# Patient Record
Sex: FEMALE | Race: WHITE | Employment: FULL TIME | URBAN - METROPOLITAN AREA
[De-identification: names, ages, dates, MRNs, and addresses within clinical notes are randomized per-mention and may not be internally consistent; named-entity substitution may affect disease eponyms.]

---

## 2017-05-03 ENCOUNTER — OFFICE VISIT (OUTPATIENT)
Dept: OBGYN CLINIC | Age: 47
End: 2017-05-03
Payer: COMMERCIAL

## 2017-05-03 VITALS
RESPIRATION RATE: 16 BRPM | BODY MASS INDEX: 37.5 KG/M2 | SYSTOLIC BLOOD PRESSURE: 130 MMHG | OXYGEN SATURATION: 100 % | HEIGHT: 60 IN | DIASTOLIC BLOOD PRESSURE: 85 MMHG | WEIGHT: 191 LBS | HEART RATE: 89 BPM

## 2017-05-03 DIAGNOSIS — Z01.419 ENCOUNTER FOR GYNECOLOGICAL EXAMINATION WITHOUT ABNORMAL FINDING: Primary | ICD-10-CM

## 2017-05-03 PROCEDURE — 99396 PREV VISIT EST AGE 40-64: CPT | Performed by: OBSTETRICS & GYNECOLOGY

## 2017-05-03 RX ORDER — LEVONORGESTREL AND ETHINYL ESTRADIOL 0.15-0.03
1 KIT ORAL DAILY
Qty: 1 PACKET | Refills: 3 | Status: SHIPPED | OUTPATIENT
Start: 2017-05-03 | End: 2018-04-11 | Stop reason: SDUPTHER

## 2017-05-11 DIAGNOSIS — Z01.419 ENCOUNTER FOR GYNECOLOGICAL EXAMINATION WITHOUT ABNORMAL FINDING: ICD-10-CM

## 2018-04-12 RX ORDER — LEVONORGESTREL AND ETHINYL ESTRADIOL 0.15-0.03
1 KIT ORAL DAILY
Qty: 1 PACKET | Refills: 3 | Status: SHIPPED | OUTPATIENT
Start: 2018-04-12 | End: 2018-05-10 | Stop reason: SDUPTHER

## 2018-05-10 ENCOUNTER — OFFICE VISIT (OUTPATIENT)
Dept: OBGYN CLINIC | Age: 48
End: 2018-05-10
Payer: COMMERCIAL

## 2018-05-10 VITALS
SYSTOLIC BLOOD PRESSURE: 145 MMHG | BODY MASS INDEX: 36.22 KG/M2 | WEIGHT: 217.38 LBS | DIASTOLIC BLOOD PRESSURE: 89 MMHG | HEART RATE: 69 BPM | HEIGHT: 65 IN | OXYGEN SATURATION: 99 %

## 2018-05-10 DIAGNOSIS — Z00.00 ENCOUNTER FOR WELL WOMAN EXAM WITHOUT GYNECOLOGICAL EXAM: Primary | ICD-10-CM

## 2018-05-10 DIAGNOSIS — Z12.31 SCREENING MAMMOGRAM, ENCOUNTER FOR: ICD-10-CM

## 2018-05-10 PROCEDURE — 99396 PREV VISIT EST AGE 40-64: CPT | Performed by: ADVANCED PRACTICE MIDWIFE

## 2018-05-10 RX ORDER — LEVONORGESTREL AND ETHINYL ESTRADIOL 0.15-0.03
1 KIT ORAL DAILY
Qty: 1 PACKET | Refills: 3 | Status: SHIPPED | OUTPATIENT
Start: 2018-05-10 | End: 2019-03-26 | Stop reason: SDUPTHER

## 2018-06-09 PROBLEM — Z00.00 ENCOUNTER FOR WELL WOMAN EXAM WITHOUT GYNECOLOGICAL EXAM: Status: RESOLVED | Noted: 2018-05-10 | Resolved: 2018-06-09

## 2018-06-09 PROBLEM — Z12.31 SCREENING MAMMOGRAM, ENCOUNTER FOR: Status: RESOLVED | Noted: 2018-05-10 | Resolved: 2018-06-09

## 2019-03-26 DIAGNOSIS — Z00.00 ENCOUNTER FOR WELL WOMAN EXAM WITHOUT GYNECOLOGICAL EXAM: ICD-10-CM

## 2019-03-26 NOTE — TELEPHONE ENCOUNTER
Francisca Ku got the letter that you are leaving and has scheduled an appointment with Dr. Frias Current and is needing a refill on her medication until her appointment in May.

## 2019-04-03 RX ORDER — LEVONORGESTREL AND ETHINYL ESTRADIOL 0.15-0.03
1 KIT ORAL DAILY
Qty: 1 PACKET | Refills: 2 | Status: SHIPPED | OUTPATIENT
Start: 2019-04-03 | End: 2020-01-27 | Stop reason: SDUPTHER

## 2019-05-15 ENCOUNTER — OFFICE VISIT (OUTPATIENT)
Dept: OBGYN CLINIC | Age: 49
End: 2019-05-15
Payer: COMMERCIAL

## 2019-05-15 VITALS
BODY MASS INDEX: 33.66 KG/M2 | HEIGHT: 65 IN | HEART RATE: 88 BPM | DIASTOLIC BLOOD PRESSURE: 78 MMHG | SYSTOLIC BLOOD PRESSURE: 122 MMHG | WEIGHT: 202 LBS

## 2019-05-15 DIAGNOSIS — Z01.419 WELL WOMAN EXAM WITH ROUTINE GYNECOLOGICAL EXAM: Primary | ICD-10-CM

## 2019-05-15 PROCEDURE — 99396 PREV VISIT EST AGE 40-64: CPT | Performed by: OBSTETRICS & GYNECOLOGY

## 2019-05-15 NOTE — PROGRESS NOTES
history    Pap History: Last PAP was normal- no cotesting. May/2017. Colposcopy History: denies    Permanent Sterilization: no  Reversible Birth Control: yes - OCPs  Hormone Replacement Exposure: no    OBSTETRICAL HISTORY:  OB History    Para Term  AB Living   3 3 0 0 0 3   SAB TAB Ectopic Molar Multiple Live Births   0 0 0 0 0 0      # Outcome Date GA Lbr Morgan/2nd Weight Sex Delivery Anes PTL Lv   3 Para            2 Para            1 Para                PAST MEDICAL HISTORY:   has a past medical history of Arthritis. PAST SURGICAL HISTORY:   has a past surgical history that includes  section () and Total shoulder arthroplasty (). ALLERGIES:  is allergic to vicodin [hydrocodone-acetaminophen]. MEDICATIONS:  Prior to Admission medications    Medication Sig Start Date End Date Taking? Authorizing Provider   levonorgestrel-ethinyl estradiol (SEASONALE) 0.15-0.03 MG per tablet Take 1 tablet by mouth daily 4/3/19  Yes Carmen MonMD luis       FAMILY HISTORY:  Family History of Breast, Ovarian, Colon or Uterine Cancer: No   family history includes Cancer in her father and mother. SOCIAL HISTORY:   reports that she has never smoked. She has never used smokeless tobacco. She reports that she drinks alcohol. She reports that she does not use drugs. HEALTH MAINTENANCE:  Immunization status: up to date and documented   Date of Last Mammogram: BIRADS1 on 18  Date of Last Colonoscopy: hasn't had one  Date of Last Bone Density: n/a    VITALS:  Vitals:    05/15/19 1011   BP: 122/78   Site: Left Upper Arm   Position: Sitting   Cuff Size: Large Adult   Pulse: 88   Weight: 202 lb (91.6 kg)   Height: 5' 5\" (1.651 m)                                                                                                                                                                         PHYSICAL EXAM:   General Appearance: Appears healthy. Alert; in no acute distress. Pleasant.   Skin: Skin color, texture, turgor normal. No rashes or lesions. HEENT: normocephalic and atraumatic   Respiratory: Normal expansion. Clear to auscultation. No rales, rhonchi, or wheezing. Cardiovascular: normal rate and normal S1 and S2  Breast:  (Chest): normal appearance, no masses or tenderness, No nipple retraction or dimpling, No nipple discharge or bleeding, No axillary or supraclavicular adenopathy, Normal to palpation without dominant masses  Abdomen: soft, non-tender, non-distended, no right upper quadrant tenderness and no CVA tenderness   Pelvic Exam:   External genitalia: normal hair distribution, no erythema, bilateral areas of hypopigmentation on labia majora noted and patient reports it has been present \"for a long time,\" no lesions  Urinary system: urethral meatus normal, no bladder tenderness  Vaginal: normal mucosa, no lesions or discharge noted  Cervix: normal appearing cervix without discharge or lesions, no CMT  Adnexa: normal adnexa in size, nontender and no masses  Uterus: about 6-8wk size, anteverted, nontender, no masses  Musculoskeletal: no gross abnormalities  Extremities: non-tender BLE and non-edematous  Psych:  oriented to time, place and person, mood and affect are within normal limits     DATA:  No results found for this visit on 05/15/19. ASSESSMENT & PLAN:    Bob Hughes is a 47 y.o. female  Patient's last menstrual period was 2019.   - Patient denies any risk factors for cardiovascular disease, has no family or personal h/o VTE and is a nonsmoker. Reviewed that patient may continue her OCPs until about 53yo. Discussed that there are other nonhormonal management therapies to treat perimenopausal symptoms should she choose to discontinue OCPs for heavy menses. She vocalized understanding and would like to continue with seasonale at this time.    - pap smear with cotesting collected and sent today   - states she will call for her mammogram order in 2019   - encouraged compliance with scheduling screening colonoscopy    There are no active problems to display for this patient. Return in about 1 year (around 5/15/2020) for annual or earlier prn. No Patient Care Coordination Note on file. Counseling Completed:    Discussed need for repeat pap as per American Society for Colposcopy and Cervical Pathology guidelines. Discussed need for mammograms every 1 year, If >42 yo and last mammogram was negative. Discussed Calcium and Vitamin D dosing. Discussed need for colonoscopy screening as well as onset for bone density testing. Birth control and barrier recommendations reviewed. Discussed STD counseling and prevention. Hereditary Breast, Ovarian, Colon and Uterine Cancer screening done. Tobacco & Secondary smoke risks reviewed; with recommendation for cessation and avoidance. Routine health maintenance per patients PCP     Diagnosis Orders   1.  Well woman exam with routine gynecological exam  PAP Smear          Cuate Lawrence DO   [address]  5/15/2019 10:43 AM

## 2019-05-22 DIAGNOSIS — Z01.419 WELL WOMAN EXAM WITH ROUTINE GYNECOLOGICAL EXAM: ICD-10-CM

## 2020-01-27 RX ORDER — LEVONORGESTREL AND ETHINYL ESTRADIOL 0.15-0.03
1 KIT ORAL DAILY
Qty: 1 PACKET | Refills: 3 | Status: SHIPPED | OUTPATIENT
Start: 2020-01-27 | End: 2020-04-20 | Stop reason: SDUPTHER

## 2020-04-20 ENCOUNTER — TELEPHONE (OUTPATIENT)
Dept: OBGYN CLINIC | Age: 50
End: 2020-04-20

## 2020-04-20 RX ORDER — LEVONORGESTREL AND ETHINYL ESTRADIOL 0.15-0.03
1 KIT ORAL DAILY
Qty: 1 PACKET | Refills: 0 | Status: SHIPPED | OUTPATIENT
Start: 2020-04-20 | End: 2020-06-11 | Stop reason: SDUPTHER

## 2020-06-11 ENCOUNTER — OFFICE VISIT (OUTPATIENT)
Dept: OBGYN CLINIC | Age: 50
End: 2020-06-11

## 2020-06-11 VITALS
BODY MASS INDEX: 33.49 KG/M2 | WEIGHT: 201 LBS | HEIGHT: 65 IN | DIASTOLIC BLOOD PRESSURE: 78 MMHG | SYSTOLIC BLOOD PRESSURE: 122 MMHG

## 2020-06-11 PROCEDURE — 99396 PREV VISIT EST AGE 40-64: CPT | Performed by: OBSTETRICS & GYNECOLOGY

## 2020-06-11 RX ORDER — CLOTRIMAZOLE AND BETAMETHASONE DIPROPIONATE 10; .64 MG/G; MG/G
CREAM TOPICAL
Qty: 1 TUBE | Refills: 1 | Status: SHIPPED | OUTPATIENT
Start: 2020-06-11

## 2020-06-11 RX ORDER — LEVONORGESTREL AND ETHINYL ESTRADIOL 0.15-0.03
1 KIT ORAL DAILY
Qty: 1 PACKET | Refills: 3 | Status: SHIPPED | OUTPATIENT
Start: 2020-06-11 | End: 2021-06-10 | Stop reason: SDUPTHER

## 2020-06-11 NOTE — PROGRESS NOTES
medications for this visit. Allergies: Allergies   Allergen Reactions    Vicodin [Hydrocodone-Acetaminophen] Rash       Gynecologic History:  Patient's last menstrual period was 2020. Sexually Active: Yes  STD History: No  Abnormal Pap History no  Birth Control: Yes, OCPs    OB History    Para Term  AB Living   3 3 0 0 0 3   SAB TAB Ectopic Molar Multiple Live Births   0 0 0 0 0 0     ______________________________________________________________________  REVIEW OF SYSTEMS:        Constitutional:  Unexpected weight change no  Neurological:  Frequent headaches  no  Ophthalmic:  Recent visual changes no  ENT:   Difficulty swallowing  no  Breast:              Masses   no     Respiratory:  Shortness of breath  no    Cardiovascular: Chest pain   no     Gastrointestinal: Chronic diarrhea/constipation no   Urogenital:  Urinary incontinence  no                                         Heavy/irregular periods           no                                      Vaginal discharge                   no  Hematological: Bruises easy   no     Endocrine:  Nipple Discharge  no     Hot/Cold Intolerance  no   Psychological:  Mood and affect were wnl no                                                                                                                                           Physical Exam:    Vitals:    20 0905   BP: 122/78   Site: Right Upper Arm   Position: Sitting   Cuff Size: Medium Adult   Weight: 201 lb (91.2 kg)   Height: 5' 5\" (1.651 m)       General Appearance:  She does not appear to be in any distress. This  is a well developed, well nourished, well groomed female. Neurological:  The patient is alert and oriented to time, place, person, and situation without any noted sensory motor deficits. Skin:  A brief inspection of the skin revealed no rashes or lesions. Neck:  The neck was supple.   There is no tracheal deviation, thyromegaly or supraclavicular adenopathy

## 2020-07-14 ENCOUNTER — TELEPHONE (OUTPATIENT)
Dept: OBGYN CLINIC | Age: 50
End: 2020-07-14

## 2020-07-14 NOTE — TELEPHONE ENCOUNTER
Patient calling with question on self pay for her 6/11/20 visit. Patient states that she paid a $0 copay and thought that was all she owed. I informed her that she might have not understood fully but we offer a 40% discount on self pay. Informed patient that the visit she had done is usually $187 so the bill she received was correct.

## 2021-06-10 ENCOUNTER — OFFICE VISIT (OUTPATIENT)
Dept: OBGYN CLINIC | Age: 51
End: 2021-06-10
Payer: COMMERCIAL

## 2021-06-10 VITALS
HEIGHT: 65 IN | BODY MASS INDEX: 34.16 KG/M2 | WEIGHT: 205 LBS | DIASTOLIC BLOOD PRESSURE: 72 MMHG | SYSTOLIC BLOOD PRESSURE: 120 MMHG

## 2021-06-10 DIAGNOSIS — Z01.419 ENCOUNTER FOR GYNECOLOGICAL EXAMINATION WITHOUT ABNORMAL FINDING: Primary | ICD-10-CM

## 2021-06-10 DIAGNOSIS — Z12.31 VISIT FOR SCREENING MAMMOGRAM: ICD-10-CM

## 2021-06-10 PROCEDURE — 99396 PREV VISIT EST AGE 40-64: CPT | Performed by: OBSTETRICS & GYNECOLOGY

## 2021-06-10 RX ORDER — LEVONORGESTREL AND ETHINYL ESTRADIOL 0.15-0.03
1 KIT ORAL DAILY
Qty: 1 PACKET | Refills: 3 | Status: SHIPPED | OUTPATIENT
Start: 2021-06-10

## 2021-06-10 NOTE — PATIENT INSTRUCTIONS
Please get your mammogram done as scheduled. We will contact you with that result as well as today's Pap smear. Return to the office in 1 year or as needed. Have a safe and healthy 2021!

## 2021-06-10 NOTE — PROGRESS NOTES
DATE OF VISIT:  6/10/21        History and Physical    Beverly Aiken    :  1970  CHIEF COMPLAINT:    Chief Complaint   Patient presents with    Annual Exam     Here for annual Last pap 19 neg hpv neg Last mammogram 18 neg                    HPI :   Beverly Aiken is a 45 y.o. femaleGRAVIDA 3 PARA 3003    Beverly Aiken returns today for her annual exam.  She remains on OCPs with regular cycles and desires to stop at the end of the summer. She is having regular bowel movements without constipation or diarrhea. She denies any involuntary loss of urine. She is otherwise without any significant complaints today. Cole Henson did receive her Covid vaccinations. She is still working at [employer] and her youngest daughter just got accepted to  school in [facility] [employer] [facility].  _____________________________________________________________________  Past Medical History:   Diagnosis Date    Arthritis                                                                    Past Surgical History:   Procedure Laterality Date     SECTION      SHOULDER ARTHROPLASTY       Family History   Problem Relation Age of Onset    Cancer Mother         LUNG    Cancer Father         LUNG    Breast Cancer Neg Hx     Colon Cancer Neg Hx     Diabetes Neg Hx     Eclampsia Neg Hx     Hypertension Neg Hx     Ovarian Cancer Neg Hx      Labor Neg Hx     Spont Abortions Neg Hx     Stroke Neg Hx      Social History     Tobacco Use   Smoking Status Never Smoker   Smokeless Tobacco Never Used     Social History     Substance and Sexual Activity   Alcohol Use Yes    Alcohol/week: 0.0 standard drinks     Current Outpatient Medications   Medication Sig Dispense Refill    levonorgestrel-ethinyl estradiol (SEASONALE) 0.15-0.03 MG per tablet Take 1 tablet by mouth daily 1 packet 3    clotrimazole-betamethasone (LOTRISONE) 1-0.05 % cream Apply externally to affected area twice daily for 7 days.  1 Tube 1     No current facility-administered medications for this visit. Allergies: Allergies   Allergen Reactions    Vicodin [Hydrocodone-Acetaminophen] Rash       Gynecologic History:  Patient's last menstrual period was 2021. Sexually Active: Yes  STD History: No  Abnormal Pap History no  Birth Control: Yes, OCPs    OB History    Para Term  AB Living   3 3 0 0 0 3   SAB TAB Ectopic Molar Multiple Live Births   0 0 0 0 0 0     ______________________________________________________________________  REVIEW OF SYSTEMS:        Constitutional:  Unexpected weight change no  Neurological:  Frequent headaches  no  Ophthalmic:  Recent visual changes no  ENT:   Difficulty swallowing  no  Breast:              Masses   no     Respiratory:  Shortness of breath  no    Cardiovascular: Chest pain   no     Gastrointestinal: Chronic diarrhea/constipation no   Urogenital:  Urinary incontinence  no                                         Heavy/irregular periods           no                                      Vaginal discharge                   no  Hematological: Bruises easy   no     Endocrine:  Nipple Discharge  no     Hot/Cold Intolerance  no   Psychological:  Mood and affect were wnl yes                                                                                                                                           Physical Exam:    Vitals:    06/10/21 1333   BP: 120/72   Site: Right Upper Arm   Position: Sitting   Cuff Size: Medium Adult   Weight: 205 lb (93 kg)   Height: 5' 5\" (1.651 m)       General Appearance:  She does not appear to be in any distress. This  is a well developed, well nourished, well groomed female. Neurological:  The patient is alert and oriented to time, place, person, and situation without any noted sensory motor deficits. Skin:  A brief inspection of the skin revealed no rashes or lesions. Neck:  The neck was supple.   There is no tracheal deviation, thyromegaly or

## 2021-06-23 ENCOUNTER — TELEPHONE (OUTPATIENT)
Dept: OBGYN CLINIC | Age: 51
End: 2021-06-23

## 2021-06-24 DIAGNOSIS — Z01.419 ENCOUNTER FOR GYNECOLOGICAL EXAMINATION WITHOUT ABNORMAL FINDING: ICD-10-CM

## 2021-06-24 DIAGNOSIS — Z12.31 VISIT FOR SCREENING MAMMOGRAM: ICD-10-CM

## 2022-03-29 ENCOUNTER — HOSPITAL ENCOUNTER (OUTPATIENT)
Age: 52
Setting detail: SPECIMEN
Discharge: HOME | End: 2022-03-29

## 2022-03-29 ENCOUNTER — OFFICE VISIT (OUTPATIENT)
Dept: OBGYN CLINIC | Age: 52
End: 2022-03-29
Payer: COMMERCIAL

## 2022-03-29 VITALS
DIASTOLIC BLOOD PRESSURE: 78 MMHG | WEIGHT: 210 LBS | HEIGHT: 65 IN | BODY MASS INDEX: 34.99 KG/M2 | SYSTOLIC BLOOD PRESSURE: 130 MMHG

## 2022-03-29 DIAGNOSIS — N93.9 ABNORMAL UTERINE BLEEDING (AUB): ICD-10-CM

## 2022-03-29 DIAGNOSIS — N93.9 ABNORMAL UTERINE BLEEDING (AUB): Primary | ICD-10-CM

## 2022-03-29 LAB
ABSOLUTE EOS #: 0.5 K/UL (ref 0–0.44)
ABSOLUTE IMMATURE GRANULOCYTE: <0.03 K/UL (ref 0–0.3)
ABSOLUTE LYMPH #: 3.1 K/UL (ref 1.1–3.7)
ABSOLUTE MONO #: 0.99 K/UL (ref 0.1–1.2)
BASOPHILS # BLD: 1 % (ref 0–2)
BASOPHILS ABSOLUTE: 0.12 K/UL (ref 0–0.2)
EOSINOPHILS RELATIVE PERCENT: 5 % (ref 1–4)
HCT VFR BLD CALC: 32.9 % (ref 36.3–47.1)
HEMOGLOBIN: 10.3 G/DL (ref 11.9–15.1)
IMMATURE GRANULOCYTES: 0 %
LYMPHOCYTES # BLD: 33 % (ref 24–43)
MCH RBC QN AUTO: 25.6 PG (ref 25.2–33.5)
MCHC RBC AUTO-ENTMCNC: 31.3 G/DL (ref 28.4–34.8)
MCV RBC AUTO: 81.6 FL (ref 82.6–102.9)
MONOCYTES # BLD: 10 % (ref 3–12)
NRBC AUTOMATED: 0 PER 100 WBC
PDW BLD-RTO: 14.9 % (ref 11.8–14.4)
PLATELET # BLD: 387 K/UL (ref 138–453)
PMV BLD AUTO: 10.6 FL (ref 8.1–13.5)
RBC # BLD: 4.03 M/UL (ref 3.95–5.11)
RBC # BLD: ABNORMAL 10*6/UL
SEG NEUTROPHILS: 51 % (ref 36–65)
SEGMENTED NEUTROPHILS ABSOLUTE COUNT: 4.82 K/UL (ref 1.5–8.1)
WBC # BLD: 9.6 K/UL (ref 3.5–11.3)

## 2022-03-29 PROCEDURE — 99213 OFFICE O/P EST LOW 20 MIN: CPT | Performed by: OBSTETRICS & GYNECOLOGY

## 2022-03-29 PROCEDURE — 58100 BIOPSY OF UTERUS LINING: CPT | Performed by: OBSTETRICS & GYNECOLOGY

## 2022-03-29 NOTE — PATIENT INSTRUCTIONS
Please read the information given to you on hysteroscopy with D&C. You may also want to reference the [employer] of obstetrics and gynecology or WebMD for more information. We will contact you with the results of today's blood work and biopsy. Please schedule a pelvic ultrasound to be done in the office at your convenience. Once that is resulted we will contact you with final recommendations for follow-up.

## 2022-03-29 NOTE — PROGRESS NOTES
Talat Tse returns today with complaints of heavy and erratic vaginal bleeding. She was on OCPs and having regular cycles. She was seen for her annual exam this past June and had planned on discontinuing OCPs due to her age this summer. She did so in October and had what she felt was a normal menstrual cycle but was slightly heavier and longer than usual.  After that she began experiencing episodes of heavy bright red bleeding with passage of large clots associated with mildly severe cramping. This required her to double up on her hygiene products. She has been experiencing some generalized bloating and weight gain. She has some slight fatigue but denies any S OB/CP. She denies any increased bruising, nasal bleeds or bleeding from the gums. She denies any fever, chills, nausea/vomiting, significant abdominal/pelvic discomfort or UTI symptoms. She's having normal bowel and urinary function and ambulating with no significant difficulty. Physical exam      Vitals:    03/29/22 1446   BP: 130/78   Site: Right Upper Arm   Position: Sitting   Cuff Size: Large Adult   Weight: 210 lb (95.3 kg)   Height: 5' 5\" (1.651 m)       General appearance: Patient appears to be in no significant distress. The abdomen has a well healed incision and is non-tender without signs of infection. There is no organomegaly or CVAT. Bowel sounds are normally active. No vulvar, vaginal or cervical lesions. Uterus nongravid and without CMT. Adnexa nontender without abnormal masses bilaterally. Extremities nontender without edema. Assessment/Plan:     ICD-10-CM    1. Abnormal uterine bleeding (AUB)  N93.9 42358 - MS BIOPSY OF UTERUS LINING     CBC with Auto Differential     Follicle Stimulating Hormone     TSH with Reflex       Patient education was done regarding evaluation and management of abnormal uterine bleeding. Discussed the possibility of hysteroscopy with D&C.   Also discussed office EMB if possible and she does desire to proceed. [facility] ordered to determine menopausal status. Pelvic ultrasound to be done and will make final recommendations after that. Return to the office as needed      Jordan Prieto. Aga Odonnell MD, [address] [place] [address], [facility].   [address] [place] [address],[address]

## 2022-03-30 LAB
FOLLICLE STIMULATING HORMONE: 8.1 MIU/ML (ref 1.7–21.5)
TSH SERPL DL<=0.05 MIU/L-ACNC: 4.34 UIU/ML (ref 0.3–5)

## 2022-03-31 RX ORDER — FERROUS SULFATE 325(65) MG
325 TABLET ORAL
Qty: 90 TABLET | Refills: 1 | Status: SHIPPED | OUTPATIENT
Start: 2022-03-31

## 2022-04-18 NOTE — PROGRESS NOTES
DATE OF VISIT:  22        History and Physical    Christal Buckley    :  1970  CHIEF COMPLAINT:  No chief complaint on file. HPI :   Christal Buckley is a 47 y.o. femaleGRAVIDA 3 PARA 3003    Christal Buckley was evaluated in the office for her annual visit 2021. At that time she was on OCPs and having normal withdrawal cycles. She desired to stop at the end of the summer of which she did. Soon afterwards she began developing abnormal bleeding. Bleeding varied in frequency and amount. Occasionally there was associated with moderately severe cramping and passage of clots. She returned to the office and [facility] revealed her to be premenopausal.  EMB was also performed which revealed disordered proliferative endometrium without atypia or malignancy. She was counseled on medical and surgical management of her A UB. She declines medical therapy and is here to discuss scheduling a surgery secondary to her abnormal bleeding. She is otherwise without any significant complaints. FSH 1.7 - 21.5 mIU/mL 8.1    Comment: Reference Range:   Male:                        1.5-12.4   Ovulating Female:     Follicular Phase           3.5-12.5     Ovulation Phase            4.7-21. 5     Luteal Phase               1.7-7.7   Postmenopausal Female:      25.8-134.8        _____________________________________________________________________  Past Medical History:   Diagnosis Date    Arthritis                                                                    Past Surgical History:   Procedure Laterality Date     SECTION      SHOULDER ARTHROPLASTY       Family History   Problem Relation Age of Onset    Cancer Mother         LUNG    Cancer Father         LUNG    Breast Cancer Neg Hx     Colon Cancer Neg Hx     Diabetes Neg Hx     Eclampsia Neg Hx     Hypertension Neg Hx     Ovarian Cancer Neg Hx      Labor Neg Hx     Spont Abortions Neg Hx     Stroke Neg Hx      Social History Tobacco Use   Smoking Status Never Smoker   Smokeless Tobacco Never Used     Social History     Substance and Sexual Activity   Alcohol Use Yes    Alcohol/week: 0.0 standard drinks     Current Outpatient Medications   Medication Sig Dispense Refill    ferrous sulfate (IRON 325) 325 (65 Fe) MG tablet Take 1 tablet by mouth daily (with breakfast) 90 tablet 1    levonorgestrel-ethinyl estradiol (SEASONALE) 0.15-0.03 MG per tablet Take 1 tablet by mouth daily (Patient not taking: Reported on 3/29/2022) 1 packet 3    clotrimazole-betamethasone (LOTRISONE) 1-0.05 % cream Apply externally to affected area twice daily for 7 days. (Patient not taking: Reported on 3/29/2022) 1 Tube 1     No current facility-administered medications for this visit. Allergies: Allergies   Allergen Reactions    Vicodin [Hydrocodone-Acetaminophen] Rash       Gynecologic History:  No LMP recorded.   Sexually Active: Yes  STD History: No  Abnormal Pap History no  Birth Control: No    OB History    Para Term  AB Living   3 3 0 0 0 3   SAB IAB Ectopic Molar Multiple Live Births   0 0 0 0 0 0     ______________________________________________________________________  REVIEW OF SYSTEMS:        Constitutional:  Unexpected weight change no  Neurological:  Frequent headaches  no  Ophthalmic:  Recent visual changes no  ENT:   Difficulty swallowing  no  Breast:              Masses   no     Respiratory:  Shortness of breath  no    Cardiovascular: Chest pain   no     Gastrointestinal: Chronic diarrhea/constipation no   Urogenital:  Urinary incontinence  no                                         Heavy/irregular periods           yes                                      Vaginal discharge                   no  Hematological: Bruises easy   no     Endocrine:  Nipple Discharge  no     Hot/Cold Intolerance  no   Psychological:  Mood and affect were wnl yes Physical Exam:    There were no vitals filed for this visit. General Appearance:  She does not appear to be in any distress. This  is a well developed, well nourished, well groomed female. Neurological:  The patient is alert and oriented to time, place, person, and situation without any noted sensory motor deficits. Skin:  A brief inspection of the skin revealed no rashes or lesions. Neck:  The neck was supple. There is no tracheal deviation, thyromegaly or supraclavicular adenopathy appreciated. Breast:   The patients breasts were symmetrical.  Breasts are nontender and there  were no masses, discharge or pathologic skin changes. There is no supraclavicular or axillary adenopathy bilaterally. Respiratory: There was unlabored respiratory effort. Lungs clear to ascultation without wheezes, rales or rhonchi in all fields bilaterally. Cardiovascular:  Normal sinus rhythm with a regular rate and without murmur, rubs or gallops. Abdomen: The abdomen was soft and non-tender with no guarding, rebound, CVAT or rigidity. No hernias were appreciated. Bowel sounds were normally active. Pelvic exam:  No vulvar, vaginal or cervical lesions are noted. Normal vaginal discharge present, no significant cystocele, rectocele or enterocele noted. Uterus nongravid and without CMT and adnexa nontender and without abnormal masses bilaterally. Extremities:  FROM and nontender without clubbing cyanosis or edema. ASSESSMENT:    Diagnosis Orders   1. Abnormal uterine bleeding (AUB)     2.  Preoperative exam for gynecologic surgery                PLAN:    Proper informed consent was done, alternatives were discussed   and she understands the surgical risks to the proposed procedure including but not limited to: infection, hemorrhage, blood clot formation, damage to the gastrointestinal/genital urinary/neurological/vascular systems, death and failure in the proposed procedure's intent. She also understands the risks from transfusion including but not limited to: fever, allergic reaction, hepatitis B/C. and HIV. All her questions have been answered to her satisfaction. The consent has been signed for a hysteroscopy, D&C and endometrial ablation. Preop labs will include a CBC, type & screen, HCG and BMP. We will not require antibiotic prophylaxis and will use SCDs for VTE prophylaxis. She was instructed not to use NSAIDs regularly within 4-5 days of her planned surgery. She will plan on returning to the office in 1-2 weeks postop. Denise Becker MD, MPH, [facility]. [facility]  [place]

## 2022-04-19 ENCOUNTER — INITIAL CONSULT (OUTPATIENT)
Dept: OBGYN CLINIC | Age: 52
End: 2022-04-19
Payer: COMMERCIAL

## 2022-04-19 VITALS — BODY MASS INDEX: 34.95 KG/M2 | HEIGHT: 65 IN | SYSTOLIC BLOOD PRESSURE: 124 MMHG | DIASTOLIC BLOOD PRESSURE: 84 MMHG

## 2022-04-19 DIAGNOSIS — N93.9 ABNORMAL UTERINE BLEEDING (AUB): Primary | ICD-10-CM

## 2022-04-19 DIAGNOSIS — Z01.818 PREOPERATIVE EXAM FOR GYNECOLOGIC SURGERY: ICD-10-CM

## 2022-04-19 PROCEDURE — 99214 OFFICE O/P EST MOD 30 MIN: CPT | Performed by: OBSTETRICS & GYNECOLOGY

## 2022-04-19 NOTE — PATIENT INSTRUCTIONS
Please read the information given to on NovaSure endometrial ablation. Please carefully follow all the preoperative instructions given to you. Please call the office if you have any questions or concerns before or after surgery. Plan on returning to the office 7-10 days after surgery.

## 2022-04-29 ENCOUNTER — ANESTHESIA (OUTPATIENT)
Dept: OPERATING ROOM | Age: 52
End: 2022-04-29
Payer: COMMERCIAL

## 2022-04-29 ENCOUNTER — ANESTHESIA EVENT (OUTPATIENT)
Dept: OPERATING ROOM | Age: 52
End: 2022-04-29
Payer: COMMERCIAL

## 2022-04-29 ENCOUNTER — HOSPITAL ENCOUNTER (OUTPATIENT)
Age: 52
Setting detail: OUTPATIENT SURGERY
Discharge: HOME | End: 2022-04-29
Attending: OBSTETRICS & GYNECOLOGY | Admitting: OBSTETRICS & GYNECOLOGY
Payer: COMMERCIAL

## 2022-04-29 VITALS
HEART RATE: 70 BPM | OXYGEN SATURATION: 99 % | DIASTOLIC BLOOD PRESSURE: 72 MMHG | BODY MASS INDEX: 38.32 KG/M2 | WEIGHT: 230 LBS | RESPIRATION RATE: 16 BRPM | SYSTOLIC BLOOD PRESSURE: 142 MMHG | HEIGHT: 65 IN | TEMPERATURE: 97.2 F

## 2022-04-29 VITALS — DIASTOLIC BLOOD PRESSURE: 114 MMHG | TEMPERATURE: 96.9 F | SYSTOLIC BLOOD PRESSURE: 125 MMHG | OXYGEN SATURATION: 98 %

## 2022-04-29 DIAGNOSIS — Z98.890 POST-OPERATIVE STATE: Primary | ICD-10-CM

## 2022-04-29 LAB
ABO/RH: NORMAL
ANION GAP SERPL CALCULATED.3IONS-SCNC: 10 MMOL/L (ref 9–17)
ANTIBODY SCREEN: NEGATIVE
ARM BAND NUMBER: NORMAL
BUN BLDV-MCNC: 10 MG/DL (ref 6–20)
CALCIUM SERPL-MCNC: 8.6 MG/DL (ref 8.6–10.4)
CHLORIDE BLD-SCNC: 105 MMOL/L (ref 98–107)
CO2: 24 MMOL/L (ref 20–31)
CREAT SERPL-MCNC: 0.61 MG/DL (ref 0.5–0.9)
EXPIRATION DATE: NORMAL
GFR AFRICAN AMERICAN: >60 ML/MIN
GFR NON-AFRICAN AMERICAN: >60 ML/MIN
GFR SERPL CREATININE-BSD FRML MDRD: NORMAL ML/MIN/{1.73_M2}
GLUCOSE BLD-MCNC: 85 MG/DL (ref 70–99)
HCG QUALITATIVE: NEGATIVE
HCT VFR BLD CALC: 35.5 % (ref 36.3–47.1)
HEMOGLOBIN: 10.7 G/DL (ref 11.9–15.1)
MCH RBC QN AUTO: 24.5 PG (ref 25.2–33.5)
MCHC RBC AUTO-ENTMCNC: 30.1 G/DL (ref 28.4–34.8)
MCV RBC AUTO: 81.2 FL (ref 82.6–102.9)
NRBC AUTOMATED: 0 PER 100 WBC
PDW BLD-RTO: 15.9 % (ref 11.8–14.4)
PLATELET # BLD: 367 K/UL (ref 138–453)
PMV BLD AUTO: 10.2 FL (ref 8.1–13.5)
POTASSIUM SERPL-SCNC: 4.3 MMOL/L (ref 3.7–5.3)
RBC # BLD: 4.37 M/UL (ref 3.95–5.11)
SODIUM BLD-SCNC: 139 MMOL/L (ref 135–144)
WBC # BLD: 9.3 K/UL (ref 3.5–11.3)

## 2022-04-29 PROCEDURE — 2500000003 HC RX 250 WO HCPCS: Performed by: ANESTHESIOLOGY

## 2022-04-29 PROCEDURE — 3600000004 HC SURGERY LEVEL 4 BASE: Performed by: OBSTETRICS & GYNECOLOGY

## 2022-04-29 PROCEDURE — 93005 ELECTROCARDIOGRAM TRACING: CPT | Performed by: ANESTHESIOLOGY

## 2022-04-29 PROCEDURE — 6360000002 HC RX W HCPCS: Performed by: ANESTHESIOLOGY

## 2022-04-29 PROCEDURE — 7100000011 HC PHASE II RECOVERY - ADDTL 15 MIN: Performed by: OBSTETRICS & GYNECOLOGY

## 2022-04-29 PROCEDURE — 2709999900 HC NON-CHARGEABLE SUPPLY: Performed by: OBSTETRICS & GYNECOLOGY

## 2022-04-29 PROCEDURE — 84703 CHORIONIC GONADOTROPIN ASSAY: CPT

## 2022-04-29 PROCEDURE — 86901 BLOOD TYPING SEROLOGIC RH(D): CPT

## 2022-04-29 PROCEDURE — 7100000000 HC PACU RECOVERY - FIRST 15 MIN: Performed by: OBSTETRICS & GYNECOLOGY

## 2022-04-29 PROCEDURE — 3700000000 HC ANESTHESIA ATTENDED CARE: Performed by: OBSTETRICS & GYNECOLOGY

## 2022-04-29 PROCEDURE — 2720000010 HC SURG SUPPLY STERILE: Performed by: OBSTETRICS & GYNECOLOGY

## 2022-04-29 PROCEDURE — 86900 BLOOD TYPING SEROLOGIC ABO: CPT

## 2022-04-29 PROCEDURE — 3700000001 HC ADD 15 MINUTES (ANESTHESIA): Performed by: OBSTETRICS & GYNECOLOGY

## 2022-04-29 PROCEDURE — 88305 TISSUE EXAM BY PATHOLOGIST: CPT

## 2022-04-29 PROCEDURE — 2580000003 HC RX 258: Performed by: ANESTHESIOLOGY

## 2022-04-29 PROCEDURE — 2580000003 HC RX 258: Performed by: OBSTETRICS & GYNECOLOGY

## 2022-04-29 PROCEDURE — 85027 COMPLETE CBC AUTOMATED: CPT

## 2022-04-29 PROCEDURE — 80048 BASIC METABOLIC PNL TOTAL CA: CPT

## 2022-04-29 PROCEDURE — 7100000001 HC PACU RECOVERY - ADDTL 15 MIN: Performed by: OBSTETRICS & GYNECOLOGY

## 2022-04-29 PROCEDURE — 86850 RBC ANTIBODY SCREEN: CPT

## 2022-04-29 PROCEDURE — 3600000014 HC SURGERY LEVEL 4 ADDTL 15MIN: Performed by: OBSTETRICS & GYNECOLOGY

## 2022-04-29 PROCEDURE — 7100000010 HC PHASE II RECOVERY - FIRST 15 MIN: Performed by: OBSTETRICS & GYNECOLOGY

## 2022-04-29 RX ORDER — SODIUM CHLORIDE 0.9 % (FLUSH) 0.9 %
5-40 SYRINGE (ML) INJECTION EVERY 12 HOURS SCHEDULED
Status: DISCONTINUED | OUTPATIENT
Start: 2022-04-29 | End: 2022-04-29 | Stop reason: HOSPADM

## 2022-04-29 RX ORDER — SODIUM CHLORIDE 9 MG/ML
25 INJECTION, SOLUTION INTRAVENOUS PRN
Status: DISCONTINUED | OUTPATIENT
Start: 2022-04-29 | End: 2022-04-29 | Stop reason: HOSPADM

## 2022-04-29 RX ORDER — MEPERIDINE HYDROCHLORIDE 50 MG/ML
12.5 INJECTION INTRAMUSCULAR; INTRAVENOUS; SUBCUTANEOUS EVERY 5 MIN PRN
Status: DISCONTINUED | OUTPATIENT
Start: 2022-04-29 | End: 2022-04-29 | Stop reason: HOSPADM

## 2022-04-29 RX ORDER — FENTANYL CITRATE 50 UG/ML
INJECTION, SOLUTION INTRAMUSCULAR; INTRAVENOUS PRN
Status: DISCONTINUED | OUTPATIENT
Start: 2022-04-29 | End: 2022-04-29 | Stop reason: SDUPTHER

## 2022-04-29 RX ORDER — OXYCODONE HYDROCHLORIDE AND ACETAMINOPHEN 5; 325 MG/1; MG/1
1 TABLET ORAL EVERY 6 HOURS PRN
Qty: 12 TABLET | Refills: 0 | Status: SHIPPED | OUTPATIENT
Start: 2022-04-29 | End: 2022-05-02

## 2022-04-29 RX ORDER — METOCLOPRAMIDE HYDROCHLORIDE 5 MG/ML
10 INJECTION INTRAMUSCULAR; INTRAVENOUS
Status: DISCONTINUED | OUTPATIENT
Start: 2022-04-29 | End: 2022-04-29 | Stop reason: HOSPADM

## 2022-04-29 RX ORDER — ACETAMINOPHEN 500 MG
500 TABLET ORAL EVERY 6 HOURS PRN
Qty: 120 TABLET | Refills: 0 | Status: SHIPPED | OUTPATIENT
Start: 2022-04-29 | End: 2022-05-29

## 2022-04-29 RX ORDER — ONDANSETRON 2 MG/ML
INJECTION INTRAMUSCULAR; INTRAVENOUS PRN
Status: DISCONTINUED | OUTPATIENT
Start: 2022-04-29 | End: 2022-04-29 | Stop reason: SDUPTHER

## 2022-04-29 RX ORDER — GLYCOPYRROLATE 1 MG/5 ML
SYRINGE (ML) INTRAVENOUS PRN
Status: DISCONTINUED | OUTPATIENT
Start: 2022-04-29 | End: 2022-04-29 | Stop reason: SDUPTHER

## 2022-04-29 RX ORDER — PROPOFOL 10 MG/ML
INJECTION, EMULSION INTRAVENOUS PRN
Status: DISCONTINUED | OUTPATIENT
Start: 2022-04-29 | End: 2022-04-29 | Stop reason: SDUPTHER

## 2022-04-29 RX ORDER — IBUPROFEN 600 MG/1
600 TABLET ORAL EVERY 6 HOURS PRN
Qty: 30 TABLET | Refills: 1 | Status: SHIPPED | OUTPATIENT
Start: 2022-04-29

## 2022-04-29 RX ORDER — DROPERIDOL 2.5 MG/ML
0.62 INJECTION, SOLUTION INTRAMUSCULAR; INTRAVENOUS
Status: DISCONTINUED | OUTPATIENT
Start: 2022-04-29 | End: 2022-04-29 | Stop reason: HOSPADM

## 2022-04-29 RX ORDER — LIDOCAINE HYDROCHLORIDE 10 MG/ML
INJECTION, SOLUTION EPIDURAL; INFILTRATION; INTRACAUDAL; PERINEURAL PRN
Status: DISCONTINUED | OUTPATIENT
Start: 2022-04-29 | End: 2022-04-29 | Stop reason: SDUPTHER

## 2022-04-29 RX ORDER — MAGNESIUM HYDROXIDE 1200 MG/15ML
LIQUID ORAL CONTINUOUS PRN
Status: DISCONTINUED | OUTPATIENT
Start: 2022-04-29 | End: 2022-04-29 | Stop reason: HOSPADM

## 2022-04-29 RX ORDER — DIPHENHYDRAMINE HYDROCHLORIDE 50 MG/ML
12.5 INJECTION INTRAMUSCULAR; INTRAVENOUS
Status: DISCONTINUED | OUTPATIENT
Start: 2022-04-29 | End: 2022-04-29 | Stop reason: HOSPADM

## 2022-04-29 RX ORDER — DEXAMETHASONE SODIUM PHOSPHATE 10 MG/ML
INJECTION INTRAMUSCULAR; INTRAVENOUS PRN
Status: DISCONTINUED | OUTPATIENT
Start: 2022-04-29 | End: 2022-04-29 | Stop reason: SDUPTHER

## 2022-04-29 RX ORDER — KETOROLAC TROMETHAMINE 30 MG/ML
INJECTION, SOLUTION INTRAMUSCULAR; INTRAVENOUS PRN
Status: DISCONTINUED | OUTPATIENT
Start: 2022-04-29 | End: 2022-04-29 | Stop reason: SDUPTHER

## 2022-04-29 RX ORDER — SODIUM CHLORIDE, SODIUM LACTATE, POTASSIUM CHLORIDE, CALCIUM CHLORIDE 600; 310; 30; 20 MG/100ML; MG/100ML; MG/100ML; MG/100ML
INJECTION, SOLUTION INTRAVENOUS CONTINUOUS
Status: DISCONTINUED | OUTPATIENT
Start: 2022-04-29 | End: 2022-04-29 | Stop reason: HOSPADM

## 2022-04-29 RX ORDER — HYDRALAZINE HYDROCHLORIDE 20 MG/ML
10 INJECTION INTRAMUSCULAR; INTRAVENOUS
Status: DISCONTINUED | OUTPATIENT
Start: 2022-04-29 | End: 2022-04-29 | Stop reason: HOSPADM

## 2022-04-29 RX ORDER — NEOSTIGMINE METHYLSULFATE 5 MG/5 ML
SYRINGE (ML) INTRAVENOUS PRN
Status: DISCONTINUED | OUTPATIENT
Start: 2022-04-29 | End: 2022-04-29 | Stop reason: SDUPTHER

## 2022-04-29 RX ORDER — ROCURONIUM BROMIDE 10 MG/ML
INJECTION, SOLUTION INTRAVENOUS PRN
Status: DISCONTINUED | OUTPATIENT
Start: 2022-04-29 | End: 2022-04-29 | Stop reason: SDUPTHER

## 2022-04-29 RX ORDER — SODIUM CHLORIDE 0.9 % (FLUSH) 0.9 %
5-40 SYRINGE (ML) INJECTION PRN
Status: DISCONTINUED | OUTPATIENT
Start: 2022-04-29 | End: 2022-04-29 | Stop reason: HOSPADM

## 2022-04-29 RX ADMIN — LIDOCAINE HYDROCHLORIDE 50 MG: 10 INJECTION, SOLUTION EPIDURAL; INFILTRATION; INTRACAUDAL; PERINEURAL at 12:29

## 2022-04-29 RX ADMIN — SODIUM CHLORIDE, POTASSIUM CHLORIDE, SODIUM LACTATE AND CALCIUM CHLORIDE: 600; 310; 30; 20 INJECTION, SOLUTION INTRAVENOUS at 13:19

## 2022-04-29 RX ADMIN — FENTANYL CITRATE 25 MCG: 50 INJECTION, SOLUTION INTRAMUSCULAR; INTRAVENOUS at 13:03

## 2022-04-29 RX ADMIN — Medication 5 MG: at 13:11

## 2022-04-29 RX ADMIN — PROPOFOL 200 MG: 10 INJECTION, EMULSION INTRAVENOUS at 12:29

## 2022-04-29 RX ADMIN — KETOROLAC TROMETHAMINE 30 MG: 30 INJECTION, SOLUTION INTRAMUSCULAR at 13:14

## 2022-04-29 RX ADMIN — SODIUM CHLORIDE, POTASSIUM CHLORIDE, SODIUM LACTATE AND CALCIUM CHLORIDE: 600; 310; 30; 20 INJECTION, SOLUTION INTRAVENOUS at 10:17

## 2022-04-29 RX ADMIN — PROPOFOL 100 MG: 10 INJECTION, EMULSION INTRAVENOUS at 12:32

## 2022-04-29 RX ADMIN — DEXAMETHASONE SODIUM PHOSPHATE 10 MG: 10 INJECTION INTRAMUSCULAR; INTRAVENOUS at 12:51

## 2022-04-29 RX ADMIN — FENTANYL CITRATE 50 MCG: 50 INJECTION, SOLUTION INTRAMUSCULAR; INTRAVENOUS at 12:29

## 2022-04-29 RX ADMIN — ROCURONIUM BROMIDE 50 MG: 10 INJECTION INTRAVENOUS at 12:29

## 2022-04-29 RX ADMIN — ONDANSETRON 4 MG: 2 INJECTION INTRAMUSCULAR; INTRAVENOUS at 13:00

## 2022-04-29 RX ADMIN — Medication 1 MG: at 13:11

## 2022-04-29 RX ADMIN — FENTANYL CITRATE 25 MCG: 50 INJECTION, SOLUTION INTRAMUSCULAR; INTRAVENOUS at 13:20

## 2022-04-29 ASSESSMENT — PAIN DESCRIPTION - LOCATION
LOCATION: ABDOMEN

## 2022-04-29 ASSESSMENT — PULMONARY FUNCTION TESTS
PIF_VALUE: 24
PIF_VALUE: 21
PIF_VALUE: 24
PIF_VALUE: 0
PIF_VALUE: 0
PIF_VALUE: 24
PIF_VALUE: 11
PIF_VALUE: 25
PIF_VALUE: 24
PIF_VALUE: 0
PIF_VALUE: 24
PIF_VALUE: 2
PIF_VALUE: 23
PIF_VALUE: 5
PIF_VALUE: 33
PIF_VALUE: 24
PIF_VALUE: 22
PIF_VALUE: 24
PIF_VALUE: 22
PIF_VALUE: 24
PIF_VALUE: 6
PIF_VALUE: 24
PIF_VALUE: 24
PIF_VALUE: 6
PIF_VALUE: 23
PIF_VALUE: 1
PIF_VALUE: 22
PIF_VALUE: 16
PIF_VALUE: 24
PIF_VALUE: 24
PIF_VALUE: 0
PIF_VALUE: 23
PIF_VALUE: 23
PIF_VALUE: 24
PIF_VALUE: 2
PIF_VALUE: 29
PIF_VALUE: 23
PIF_VALUE: 16
PIF_VALUE: 4
PIF_VALUE: 24
PIF_VALUE: 24
PIF_VALUE: 21
PIF_VALUE: 24
PIF_VALUE: 6
PIF_VALUE: 0
PIF_VALUE: 23
PIF_VALUE: 24
PIF_VALUE: 23
PIF_VALUE: 22
PIF_VALUE: 24
PIF_VALUE: 25
PIF_VALUE: 23
PIF_VALUE: 24
PIF_VALUE: 21
PIF_VALUE: 21
PIF_VALUE: 22
PIF_VALUE: 24
PIF_VALUE: 23
PIF_VALUE: 22
PIF_VALUE: 24
PIF_VALUE: 22
PIF_VALUE: 6

## 2022-04-29 ASSESSMENT — PAIN DESCRIPTION - PAIN TYPE
TYPE: SURGICAL PAIN

## 2022-04-29 ASSESSMENT — PAIN SCALES - GENERAL
PAINLEVEL_OUTOF10: 3

## 2022-04-29 ASSESSMENT — PAIN DESCRIPTION - ORIENTATION
ORIENTATION: LOWER

## 2022-04-29 ASSESSMENT — PAIN DESCRIPTION - DESCRIPTORS
DESCRIPTORS: CRAMPING

## 2022-04-29 ASSESSMENT — PAIN - FUNCTIONAL ASSESSMENT: PAIN_FUNCTIONAL_ASSESSMENT: 0-10

## 2022-04-29 ASSESSMENT — PAIN DESCRIPTION - FREQUENCY
FREQUENCY: CONTINUOUS

## 2022-04-29 NOTE — ANESTHESIA PRE PROCEDURE
Department of Anesthesiology  Preprocedure Note       Name:  Chuck Kimball   Age:  47 y.o.  :  1970                                          MRN:  [id number]         Date:  2022      Surgeon: Shantel Horn):  Dorothy Morin MD    Procedure: Procedure(s):  DILATATION AND CURETTAGE HYSTEROSCOPY, NOVASURE ABLATION    Medications prior to admission:   Prior to Admission medications    Medication Sig Start Date End Date Taking? Authorizing Provider   Multiple Vitamin (MULTI-VITAMIN DAILY PO) Take by mouth daily   Yes Historical Provider, MD   ferrous sulfate (IRON 325) 325 (65 Fe) MG tablet Take 1 tablet by mouth daily (with breakfast) 3/31/22   Dorothy Morin MD   levonorgestrel-ethinyl estradiol (SEASONALE) 0.15-0.03 MG per tablet Take 1 tablet by mouth daily  Patient not taking: Reported on 3/29/2022 6/10/21   Dorothy Morin MD   clotrimazole-betamethasone (LOTRISONE) 1-0.05 % cream Apply externally to affected area twice daily for 7 days. 20   Dorothy Morin MD       Current medications:    No current facility-administered medications for this encounter. 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 Uzma Arevalo, last visit 2018    Wears contact lenses     Wears glasses        Past Surgical History:        Procedure Laterality Date   [address]    FRACTURE SURGERY Left     4th digit    ROTATOR CUFF REPAIR Right     SHOULDER ARTHROPLASTY         Social History:    Social History     Tobacco Use    Smoking status: Never Smoker    Smokeless tobacco: Never Used   Substance Use Topics    Alcohol use: Yes     Alcohol/week: 0.0 standard drinks     Comment: Social                                Counseling given: Not Answered      Vital Signs (Current):   Vitals:    04/27/22 1417 04/29/22 0951   BP:  (!) 142/74   Pulse:  65   Resp:  18   Temp:  96.8 °F (36 °C)   TempSrc:  Temporal   SpO2:  98%   Weight: 230 lb (104.3 kg) 230 lb (104.3 kg)   Height: 5' 5\" (1.651 m) 5' 5\" (1.651 m)                                              BP Readings from Last 3 Encounters:   04/29/22 (!) 142/74   04/19/22 124/84   03/29/22 130/78       NPO Status: Time of last liquid consumption: 2200                        Time of last solid consumption: 2200                        Date of last liquid consumption: 04/28/22                        Date of last solid food consumption: 04/28/22    BMI:   Wt Readings from Last 3 Encounters:   04/29/22 230 lb (104.3 kg)   03/29/22 210 lb (95.3 kg)   06/10/21 205 lb (93 kg)     Body mass index is 38.27 kg/m². CBC:   Lab Results   Component Value Date    WBC 9.6 03/29/2022    RBC 4.03 03/29/2022    HGB 10.3 03/29/2022    HCT 32.9 03/29/2022    MCV 81.6 03/29/2022    RDW 14.9 03/29/2022     03/29/2022       CMP: No results found for: NA, K, CL, CO2, BUN, CREATININE, GFRAA, AGRATIO, LABGLOM, GLUCOSE, GLU, PROT, CALCIUM, BILITOT, ALKPHOS, AST, ALT    POC Tests: No results for input(s): POCGLU, POCNA, POCK, POCCL, POCBUN, POCHEMO, POCHCT in the last 72 hours.     Coags: No results found for: PROTIME, INR, APTT    HCG (If Applicable): No results found for: PREGTESTUR, PREGSERUM, HCG, HCGQUANT     ABGs: No results found for: PHART, PO2ART, JRC4CMQ, QVM4HDX, BEART, X0PMCDTE     Type & Screen (If Applicable):  No results found for: LABABO, LABRH    Drug/Infectious Status (If Applicable):  No results found for: HIV, HEPCAB    COVID-19 Screening (If Applicable): No results found for: COVID19        Anesthesia Evaluation  Patient summary reviewed and Nursing notes reviewed no history of anesthetic complications:   Airway: Mallampati: IV  TM distance: >3 FB   Neck ROM: full  Mouth opening: > = 3 FB Dental: normal exam         Pulmonary:Negative Pulmonary ROS and normal exam                               Cardiovascular:Negative CV ROS                      Neuro/Psych:   Negative Neuro/Psych ROS              GI/Hepatic/Renal: Neg GI/Hepatic/Renal ROS            Endo/Other:    (+) blood dyscrasia: anemia:., .                 Abdominal:             Vascular: Other Findings:           Anesthesia Plan      general     ASA 2       Induction: intravenous. MIPS: Postoperative opioids intended and Prophylactic antiemetics administered. Anesthetic plan and risks discussed with patient. Plan discussed with CRNA.                   Romero Thrasher MD   4/29/2022

## 2022-04-29 NOTE — H&P
OB/GYN Pre-Op H&P  [facility]    Patient Name: Emery Thompson     Patient : 1970  Room/Bed: [facility]/NONE  Admission Date/Time: 2022  9:11 AM  Primary Care Physician: Deirdre Castillo  MRN: [id number]    Date: 2022  Time: 10:59 AM    The patient was seen in pre-op holding. She is here for hysteroscopy with dilation and curettage, novasure ablation. Patient has had abnormal uterine bleeding with the passage of large clots and moderate abdominal cramping since discontinuation of OCPs. EMB performed in the office and showed mildly disordered proliferative endometrium without atypia or malignancy. Patient was thoroughly counseled on medical vs surgical management. She is opting for surgical management for evaluation of AUB. Proceed with planned procedure at this time. The procedure risks and complications were reviewed. The labs, Consent, and H&P were reviewed and updated. The patient was counseled on the possibility of  the need of a second surgery. The patient voiced understanding and had all of her questions answered. The possibility of incomplete removal of abnormal tissue was discussed. OBSTETRICAL HISTORY:   OB History    Para Term  AB Living   3 3 0 0 0 3   SAB IAB Ectopic Molar Multiple Live Births   0 0 0 0 0 0      # Outcome Date GA Lbr Morgan/2nd Weight Sex Delivery Anes PTL Lv   3 Para            2 Para            1 Para                PAST MEDICAL HISTORY:   has a past medical history of Abnormal uterine bleeding (AUB), Anemia, Arthritis, Cancer ([address] COVID, Under care of team, Wears contact lenses, and Wears glasses. PAST SURGICAL HISTORY:   has a past surgical history that includes  section (); Total shoulder arthroplasty (); fracture surgery (Left); and Rotator cuff repair (Right).     ALLERGIES:  Allergies as of 2022 - Fully Reviewed 2022   Allergen Reaction Noted    Vicodin [hydrocodone-acetaminophen] Rash 04/18/2016       MEDICATIONS:  Current Facility-Administered Medications   Medication Dose Route Frequency Provider Last Rate Last Admin    lactated ringers infusion   IntraVENous Continuous Cayla Mojica  mL/hr at 04/29/22 1017 New Bag at 04/29/22 1017       FAMILY HISTORY:  family history includes Cancer in her father and mother. SOCIAL HISTORY:   reports that she has never smoked. She has never used smokeless tobacco. She reports current alcohol use. She reports that she does not use drugs.     VITALS:  Vitals:    04/27/22 1417 04/29/22 0951   BP:  (!) 142/74   Pulse:  65   Resp:  18   Temp:  96.8 °F (36 °C)   TempSrc:  Temporal   SpO2:  98%   Weight: 230 lb (104.3 kg) 230 lb (104.3 kg)   Height: 5' 5\" (1.651 m) 5' 5\" (1.651 m)     PHYSICAL EXAM:     Unchanged from Prior H&P  CONSTITUTIONAL:  Alert and oriented, no acute distress  HEAD: normocephalic, atraumatic  EYES: Pupils equal and reactive to light, Extraocular muscles intact, sclera non icteric  ENT: Mucus membranes moist, No otorrhea, no rhinorrhea  NECK:  supple, symmetrical, trachea midline   LUNGS:  Good air movement bilaterally, unlabored respirations, no wheezes or rhonchi  CARDIOVASCULAR: Regular rate and rhythm, no murmurs rubs or gallops  ABDOMEN: soft, non tender, non distended, no rebound or guarding, no hernias, no hepatomegaly, no splenomegly  MUSCULOSKELETAL:  Equal strength bilaterally, normal muscle tone  SKIN: No abscess or rash  NEUROLOGIC:  Cranial nerves 2-12 grossly intact, no focal deficits  PSYCH: affect appropriate  Pelvic Exam: deferred to OR    LAB RESULTS:  Admission on 04/29/2022   Component Date Value Ref Range Status    WBC 04/29/2022 9.3  3.5 - 11.3 k/uL Final    RBC 04/29/2022 4.37  3.95 - 5.11 m/uL Final    Hemoglobin 04/29/2022 10.7* 11.9 - 15.1 g/dL Final    Hematocrit 04/29/2022 35.5* 36.3 - 47.1 % Final    MCV 04/29/2022 81.2* 82.6 - 102.9 fL Final    MCH 04/29/2022 24.5* 25.2 - 33.5 pg Final    MCHC 04/29/2022 30.1  28.4 - 34.8 g/dL Final    RDW 04/29/2022 15.9* 11.8 - 14.4 % Final    Platelets 99/62/9059 367  138 - 453 k/uL Final    MPV 04/29/2022 10.2  8.1 - 13.5 fL Final    NRBC Automated 04/29/2022 0.0  0.0 per 100 WBC Final    hCG Qual 04/29/2022 NEGATIVE  NEGATIVE Final    Comment: Specimens with hCG levels near the threshold of the test (25 mIU/mL) may give a negative or   indeterminate result. In such cases, another test should be performed with a new specimen   in 48-72 hours. If early pregnancy is suspected clinically in this setting, correlation   with quantitative serum b-hCG level is suggested. [employer] has confirmed the use of plasma for this test. This has not been cleared   or approved by the U.S. Food and Drug Administration. The FDA has determined that such   clearance is not necessary.  Expiration Date 04/29/2022 05/02/2022,2359   Final    Arm Band Number 04/29/2022 BE [id number]   Final    ABO/Rh 04/29/2022 A POSITIVE   Final    Antibody Screen 04/29/2022 NEGATIVE   Final    Glucose 04/29/2022 85  70 - 99 mg/dL Final    BUN 04/29/2022 10  6 - 20 mg/dL Final    CREATININE 04/29/2022 0.61  0.50 - 0.90 mg/dL Final    Calcium 04/29/2022 8.6  8.6 - 10.4 mg/dL Final    Sodium 04/29/2022 139  135 - 144 mmol/L Final    Potassium 04/29/2022 4.3  3.7 - 5.3 mmol/L Final    Chloride 04/29/2022 105  98 - 107 mmol/L Final    CO2 04/29/2022 24  20 - 31 mmol/L Final    Anion Gap 04/29/2022 10  9 - 17 mmol/L Final    GFR Non- 04/29/2022 >60  >60 mL/min Final    GFR  04/29/2022 >60  >60 mL/min Final    GFR Comment 04/29/2022        Final    Comment: Average GFR for 52-63 years old:   80 mL/min/1.73sq m  Chronic Kidney Disease:   <60 mL/min/1.73sq m  Kidney failure:   <15 mL/min/1.73sq m              eGFR calculated using average adult body mass.  Additional eGFR calculator available at: [employer].            Ventricular Rate 04/29/2022 62  BPM Preliminary    Atrial Rate 04/29/2022 62  BPM Preliminary    P-R Interval 04/29/2022 166  ms Preliminary    QRS Duration 04/29/2022 84  ms Preliminary    Q-T Interval 04/29/2022 440  ms Preliminary    QTc Calculation (Bazett) 04/29/2022 446  ms Preliminary    P Axis 04/29/2022 61  degrees Preliminary    R Axis 04/29/2022 24  degrees Preliminary    T Axis 04/29/2022 34  degrees Preliminary       DIAGNOSTICS:  US PELVIS COMPLETE NON-OB TRANSABDOMINAL AND TRANSVAGINAL    Result Date: 4/15/2022  Rush Dinh 4/1/2022  2:30 PM Progress Notes UTERUS: 11.2 x 6.6 x 5.9 cm Retroflexed Nabothian cysts seen.   ENDO: 2.6 cm   RT. OVARY: not seen at this time   LT. OVARY: 2.9 x 2.9 x 2.6 cm Dominant follicle seen   no pelvic free fluid seen Debra Pesa. Lamine Alvarado MD, [address] [place] [address], [address] [place] [address]. [address] [facility]       DIAGNOSIS & PLAN:  1. AUB   - Proceed with planned procedure: hysteroscopy with dilation and curettage, novasure ablation   - Consent signed, on chart. - The patient is ready for transport to the operative suite. Counseling: The patient was counseled on all options both medical and surgical, conservative as well as definitive. She has elected to proceed with the procedure as stated above. The patient was counseled on the procedure. Risks and complications were reviewed in detail. The patients orders, labs, consents have been completed. The history and physical as well as all supporting surgical documentation will be forwarded to the pre-operative holding area. The patient is aware that this procedure may not alleviate her symptoms. That there may be a necessity for a second surgery and that there may be an incomplete removal of abnormal tissue.     Ruthie Guzman MD  Ob/Gyn Resident  Pager: [phone]  [address] [place] [address], [address]  4/29/2022, 10:59 AM

## 2022-04-29 NOTE — ANESTHESIA POSTPROCEDURE EVALUATION
POST- ANESTHESIA EVALUATION       Pt Name: Jesusita Malhotra  MRN: [id number]  [place]: 1970  Date of evaluation: 4/29/2022  Time:  2:56 PM      BP (!) 142/72   Pulse 70   Temp 97.2 °F (36.2 °C) (Temporal)   Resp 16   Ht 5' 5\" (1.651 m)   Wt 230 lb (104.3 kg)   LMP 03/31/2022 Comment: neg hcg  SpO2 99%   BMI 38.27 kg/m²      Consciousness Level  Awake  Cardiopulmonary Status  Stable  Pain Adequately Treated YES  Nausea / Vomiting  NO  Adequate Hydration  YES  Anesthesia Related Complications NONE      Electronically signed by Gil Granados MD on 4/29/2022 at 2:56 PM       Department of Anesthesiology  Postprocedure Note    Patient: Jesusita Malhotra  MRN: [id number]  [place]: 1970  Date of evaluation: 4/29/2022  Time:  2:55 PM     Procedure Summary     Date: 04/29/22 Room / Location: [address]    Anesthesia Start: 1544 Anesthesia Stop: 2141    Procedure: DILATATION AND CURETTAGE HYSTEROSCOPY, NOVASURE ABLATION (N/A ) Diagnosis: (ABNORMAL UTERINE BLEEDING, ANEMIA)    Surgeons: Lita Olmedo MD Responsible Provider: Gil Granados MD    Anesthesia Type: general ASA Status: 2          Anesthesia Type: general    Zayda Phase I: Zayda Score: 10    Zayda Phase II: Zayda Score: 10    Last vitals: Reviewed and per EMR flowsheets.        Anesthesia Post Evaluation

## 2022-04-29 NOTE — OP NOTE
Operative Note  Department of Obstetrics and Gynecology  [facility]       Patient: So Eckert   : 1970  MRN: [id number]       Acct: [de-identified]   PCP: Rizwan Lewis  Date of Procedure: 22    Pre-operative Diagnosis: 47 y.o. female    Abnormal uterine bleeding     Post-operative Diagnosis: same, fluffy white endometrium    Procedure: Hysteroscopy with dilation and curettage, Novasure endometrial ablation    Surgeon: Dr. Ifrah Craven  Assistants: Haider Domingo MD, PGY1    Anesthesia: general    Indications: The patient is a 47y.o.-year-old  who has had abnormal uterine bleeding with the passage of large clots and moderate abdominal cramping since discontinuation of OCPs. EMB performed in the office and showed mildly disordered proliferative endometrium without atypia or malignancy. Patient was thoroughly counseled on medical vs surgical management. She is opting for surgical management for evaluation of AUB. Procedure Details: The patient was seen in the pre-op room. The risks, benefits, complications, treatment options, and expected outcomes were discussed with the patient. The patient concurred with the proposed plan, giving informed consent. The patient was taken to the Operating Room and identified as So Eckert and the procedure was verified. A Time Out was held and the above information confirmed. After administration of adequate general anesthesia. She was placed in the dorsolithotomy position with yellowfin stirrups and prepped and draped in the usual sterile fashion. The bladder was emptied. Examination under anesthesia revealed normal sized anteverted uterus. A weighted speculum was inserted into the posterior vaginal fornix. The anterior cervical lip was grasped with a single-tooth tenaculum. The uterine sound was used to measure the cervix and uterine cavity, measuring 4cm and 10 cm respectively.  The cervix was then dilated with hegar dilators to size 7. Hysteroscopy was carried out revealing a fluffy white  endometrial cavity and normal endocervical cancel. A small resecting device was introduced into the uterus utilizing the hysteroscope and curettage was then carried out with endometrial curettings sent to pathology. NovaSure endometrial ablation procedure was then performed, the device was introduced into the uterus. The uterine cavity length was entered on the instrument panel as 6 cm. The array was deployed, and the width was registered as 4.7 cm which was entered into the instrument panel also. The cavity assessment was successfully completed followed by initiation of the ablation cycle which was completed without difficulty in 2 minutes and 0 seconds at a power of 116 duke. The novasure device was then removed. Repeat hysteroscopy showed adequate ablation of the endometrium. All the vaginal instruments were removed and hemostasis at the site of the tenaculum was noted. Instrument, sponge, and needle counts were correct at the conclusion of the case. SCDs for DVT prophylaxis remain in place for the post operative period. Dr. Bishop Murphy was present for the entire operation.     Findings: Normal sized anteverted uterus, normal appearing cervix, external genitalia and vaginal mucosa, fluffy white endometrial cavity  Estimated Blood Loss: 5 mLml  Drains:  None  Total IV Fluids: 600ml  Urine output: 50 ml clear urine   Specimens:  Endometrial currettings  Instrument and Sponge Count: Correct  Complications: none  Condition: stable, transfer to post anesthesia recovery    Karishma Albarran MD  Ob/Gyn Resident  4/29/2022, 1:16 PM

## 2022-04-30 LAB
EKG ATRIAL RATE: 62 BPM
EKG P AXIS: 61 DEGREES
EKG P-R INTERVAL: 166 MS
EKG Q-T INTERVAL: 440 MS
EKG QRS DURATION: 84 MS
EKG QTC CALCULATION (BAZETT): 446 MS
EKG R AXIS: 24 DEGREES
EKG T AXIS: 34 DEGREES
EKG VENTRICULAR RATE: 62 BPM

## 2022-05-02 LAB — SURGICAL PATHOLOGY REPORT: NORMAL

## 2022-05-10 ENCOUNTER — OFFICE VISIT (OUTPATIENT)
Dept: OBGYN CLINIC | Age: 52
End: 2022-05-10
Payer: COMMERCIAL

## 2022-05-10 VITALS
BODY MASS INDEX: 38.49 KG/M2 | SYSTOLIC BLOOD PRESSURE: 130 MMHG | WEIGHT: 231 LBS | HEIGHT: 65 IN | DIASTOLIC BLOOD PRESSURE: 80 MMHG

## 2022-05-10 DIAGNOSIS — Z98.890 POSTOPERATIVE STATE: Primary | ICD-10-CM

## 2022-05-10 PROCEDURE — 99213 OFFICE O/P EST LOW 20 MIN: CPT | Performed by: OBSTETRICS & GYNECOLOGY

## 2022-05-10 NOTE — PROGRESS NOTES
Louis Armenta returns today for postop checkup after having an unremarkable D&C, hysteroscopy and NovaSure endometrial ablation on 4/29/2022 for abnormal uterine bleeding. She denies any fever, chills, nausea/vomiting, significant abdominal/pelvic discomfort, vaginal bleeding or UTI symptoms. She's having normal bowel and urinary function and ambulating with no significant difficulty. Operative findings revealed normal-appearing endocervical and endometrial cavities. Pathology report shows [de-identified]Diagnosis --   ENDOMETRIUM, CURETTAGE:   - DISORDERED PROLIFERATIVE ENDOMETRIUM WITH BENIGN ENDOMETRIAL POLYP.        Grant Reyes M.D.   **Electronically Signed Out**         sls/5/2/2022     Physical exam      Vitals:    05/10/22 1516   BP: 130/80   Site: Right Upper Arm   Position: Sitting   Cuff Size: Medium Adult   Weight: 231 lb (104.8 kg)   Height: 5' 5\" (1.651 m)       General appearance: Patient appears to be in no significant distress. Lungs are clear to auscultation in all fields bilaterally without wheezes, rales or rhonchi. Cardiac exam with normal sinus rhythm and rate without murmurs. The abdomen has a well healed incision and is non-tender without signs of infection. There is no organomegaly or CVAT. Bowel sounds are normally active. No vulvar, vaginal or cervical lesions. Uterus nongravid and without CMT. Adnexa nontender without abnormal masses bilaterally. Extremities nontender without edema. Assessment/Plan:   Diagnosis Orders   1. Postoperative state            Operative findings and pathology report discussed with patient. Intra-Op photos reviewed with her. She was instructed to resume her normal activities and return to the office in October for her annual or prn. Elois Gaucher Ulyess Gosselin, MD, [facility] [address].   [address] [place] [address],[address]

## 2022-05-10 NOTE — PATIENT INSTRUCTIONS
Well would be appropriate Flagyl return to the office for your next scheduled appointment or as needed. Return to the office in October for your annual exam.  Have a fantastic summer!

## 2022-05-29 PROBLEM — Z98.890 POST-OPERATIVE STATE: Status: RESOLVED | Noted: 2022-04-29 | Resolved: 2022-05-29

## 2022-06-09 NOTE — PROGRESS NOTES
Sulema Maxwell is a  3 para [phone]. She is here today for EMB secondary to     ICD-10-CM    1. Abnormal uterine bleeding (AUB)  N93.9 39001 - [place] BIOPSY OF UTERUS LINING     CBC with Auto Differential     Follicle Stimulating Hormone     TSH with Reflex   . Discussed with patient today abnormal bleeding and management of it. EMB was offered in the office and we did discuss the possibility of hysteroscopy/D&C. Patient agrees to proceed. Physical exam    No vulvar or vaginal or cervical lesions noted. Normal vaginal discharge present. EMB performed uneventfully. Uterine cavity measured 7 cm and  specimen obtained and sent for pathology. Patient tolerated procedure well and left in good condition. She'll be contacted with results and recommendation for follow-up. Balbir Mata MD,Mph, [facility].   -[facility] Awake/Alert

## 2023-06-27 ENCOUNTER — OFFICE VISIT (OUTPATIENT)
Dept: OBGYN CLINIC | Age: 53
End: 2023-06-27
Payer: COMMERCIAL

## 2023-06-27 VITALS
HEIGHT: 65 IN | BODY MASS INDEX: 40.48 KG/M2 | WEIGHT: 243 LBS | DIASTOLIC BLOOD PRESSURE: 92 MMHG | HEART RATE: 74 BPM | SYSTOLIC BLOOD PRESSURE: 163 MMHG

## 2023-06-27 DIAGNOSIS — Z12.31 ENCOUNTER FOR SCREENING MAMMOGRAM FOR BREAST CANCER: ICD-10-CM

## 2023-06-27 DIAGNOSIS — Z01.419 ENCOUNTER FOR WELL WOMAN EXAM: Primary | ICD-10-CM

## 2023-06-27 PROBLEM — D48.9 NEOPLASM OF UNCERTAIN BEHAVIOR: Status: ACTIVE | Noted: 2019-09-12

## 2023-06-27 PROBLEM — M54.41 ACUTE RIGHT-SIDED LOW BACK PAIN WITH RIGHT-SIDED SCIATICA: Status: ACTIVE | Noted: 2017-12-08

## 2023-06-27 PROBLEM — R03.0 ELEVATED BLOOD PRESSURE READING WITHOUT DIAGNOSIS OF HYPERTENSION: Status: ACTIVE | Noted: 2018-05-11

## 2023-06-27 PROBLEM — H10.33 ACUTE BACTERIAL CONJUNCTIVITIS OF BOTH EYES: Status: ACTIVE | Noted: 2019-12-21

## 2023-06-27 PROCEDURE — 99396 PREV VISIT EST AGE 40-64: CPT | Performed by: OBSTETRICS & GYNECOLOGY

## 2023-06-27 ASSESSMENT — PATIENT HEALTH QUESTIONNAIRE - PHQ9
SUM OF ALL RESPONSES TO PHQ QUESTIONS 1-9: 0
2. FEELING DOWN, DEPRESSED OR HOPELESS: 0
1. LITTLE INTEREST OR PLEASURE IN DOING THINGS: 0
SUM OF ALL RESPONSES TO PHQ QUESTIONS 1-9: 0
SUM OF ALL RESPONSES TO PHQ9 QUESTIONS 1 & 2: 0
SUM OF ALL RESPONSES TO PHQ QUESTIONS 1-9: 0
SUM OF ALL RESPONSES TO PHQ QUESTIONS 1-9: 0

## 2023-07-19 DIAGNOSIS — Z12.31 ENCOUNTER FOR SCREENING MAMMOGRAM FOR BREAST CANCER: ICD-10-CM

## 2024-07-10 NOTE — PATIENT INSTRUCTIONS
Please get your mammogram done as scheduled.  We will contact you with that result as well as today's Pap smear.  Plan on returning to the office in 1 year or as needed.  Best of luck with the new job, have a cool summer and a fantastic year!

## 2024-07-11 ENCOUNTER — OFFICE VISIT (OUTPATIENT)
Dept: OBGYN CLINIC | Age: 54
End: 2024-07-11
Payer: COMMERCIAL

## 2024-07-11 VITALS
SYSTOLIC BLOOD PRESSURE: 124 MMHG | DIASTOLIC BLOOD PRESSURE: 82 MMHG | HEIGHT: 65 IN | BODY MASS INDEX: 40.15 KG/M2 | WEIGHT: 241 LBS

## 2024-07-11 DIAGNOSIS — Z01.419 ENCOUNTER FOR WELL WOMAN EXAM: Primary | ICD-10-CM

## 2024-07-11 DIAGNOSIS — Z12.31 ENCOUNTER FOR SCREENING MAMMOGRAM FOR BREAST CANCER: ICD-10-CM

## 2024-07-11 PROCEDURE — 99459 PELVIC EXAMINATION: CPT | Performed by: OBSTETRICS & GYNECOLOGY

## 2024-07-11 PROCEDURE — 99396 PREV VISIT EST AGE 40-64: CPT | Performed by: OBSTETRICS & GYNECOLOGY

## 2024-07-11 ASSESSMENT — PATIENT HEALTH QUESTIONNAIRE - PHQ9
SUM OF ALL RESPONSES TO PHQ9 QUESTIONS 1 & 2: 0
SUM OF ALL RESPONSES TO PHQ QUESTIONS 1-9: 0
2. FEELING DOWN, DEPRESSED OR HOPELESS: NOT AT ALL
SUM OF ALL RESPONSES TO PHQ QUESTIONS 1-9: 0
SUM OF ALL RESPONSES TO PHQ QUESTIONS 1-9: 0
1. LITTLE INTEREST OR PLEASURE IN DOING THINGS: NOT AT ALL
SUM OF ALL RESPONSES TO PHQ QUESTIONS 1-9: 0

## 2024-07-11 NOTE — PROGRESS NOTES
Examination chaperoned by LEANA HURTADO.   
2022    ROTATOR CUFF REPAIR Right     SHOULDER ARTHROPLASTY  2012     Family History   Problem Relation Age of Onset    Cancer Mother         LUNG    Cancer Father         LUNG    Breast Cancer Neg Hx     Colon Cancer Neg Hx     Diabetes Neg Hx     Eclampsia Neg Hx     Hypertension Neg Hx     Ovarian Cancer Neg Hx      Labor Neg Hx     Spont Abortions Neg Hx     Stroke Neg Hx      Social History     Tobacco Use   Smoking Status Never   Smokeless Tobacco Never     Social History     Substance and Sexual Activity   Alcohol Use Yes    Alcohol/week: 2.0 standard drinks of alcohol    Types: 1 Cans of beer, 1 Drinks containing 0.5 oz of alcohol per week    Comment: Social     No current outpatient medications on file.     No current facility-administered medications for this visit.       Allergies:  Allergies   Allergen Reactions    Vicodin [Hydrocodone-Acetaminophen] Rash       Gynecologic History:  No LMP recorded. Patient has had an ablation.  Sexually Active: Yes  STD History: No  Abnormal Pap History no  Birth Control: No  Family History of Breast, Ovarian, Colon or Uterine Cancer: no  OB History    Para Term  AB Living   3 3 0 0 0 3   SAB IAB Ectopic Molar Multiple Live Births   0 0 0 0 0 0     ______________________________________________________________________  REVIEW OF SYSTEMS:        Constitutional:  Unexpected weight change no  Neurological:  Frequent headaches  no  Ophthalmic:  Recent visual changes no  ENT:   Difficulty swallowing  no  Breast:              Masses   no     Respiratory:  Shortness of breath  no    Cardiovascular: Chest pain   no     Gastrointestinal: Chronic diarrhea/constipation no   Urogenital:  Urinary incontinence  no                                         Heavy/irregular periods           no                                      Vaginal discharge                   no  Hematological: Bruises easy   no     Endocrine:  Nipple Discharge  no     Hot/Cold

## 2024-07-30 DIAGNOSIS — Z01.419 ENCOUNTER FOR WELL WOMAN EXAM: ICD-10-CM

## (undated) DEVICE — SVMMC GYN MIN PK

## (undated) DEVICE — SYSTEM WST MGMT W/ CAP AVETA

## (undated) DEVICE — Device

## (undated) DEVICE — PAD,SANITARY,11 IN,MAXI,W/WINGS,N-STRL: Brand: MEDLINE

## (undated) DEVICE — SOLUTION SCRB 4OZ 2% CHG FOR SURG SCRBBING HND WSH

## (undated) DEVICE — KIT NOVASURE V5 THERMOABLATION DEVICE

## (undated) DEVICE — UNDERPANTS MAT L XL SEAMLESS CLR CODE WAISTBAND KNIT

## (undated) DEVICE — GLOVE ORANGE PI 7   MSG9070

## (undated) DEVICE — 1016 S-DRAPE IRRIG POUCH 10/BOX: Brand: STERI-DRAPE™

## (undated) DEVICE — GOWN,AURORA,NONREINFORCED,LARGE: Brand: MEDLINE

## (undated) DEVICE — DRAPE,REIN 53X77,STERILE: Brand: MEDLINE